# Patient Record
Sex: MALE | Race: WHITE | Employment: OTHER | URBAN - METROPOLITAN AREA
[De-identification: names, ages, dates, MRNs, and addresses within clinical notes are randomized per-mention and may not be internally consistent; named-entity substitution may affect disease eponyms.]

---

## 2017-06-22 PROBLEM — Z80.42 FAMILY HISTORY OF PROSTATE CANCER IN FATHER: Status: ACTIVE | Noted: 2017-06-22

## 2017-06-22 PROBLEM — Z12.5 PROSTATE CANCER SCREENING: Status: ACTIVE | Noted: 2017-06-22

## 2022-06-06 ENCOUNTER — HOSPITAL ENCOUNTER (OUTPATIENT)
Dept: PHYSICAL THERAPY | Age: 67
Discharge: HOME | End: 2022-06-06
Payer: MEDICARE

## 2022-06-06 PROCEDURE — 97162 PT EVAL MOD COMPLEX 30 MIN: CPT

## 2022-06-06 PROCEDURE — 97110 THERAPEUTIC EXERCISES: CPT

## 2022-06-06 PROCEDURE — 97116 GAIT TRAINING THERAPY: CPT

## 2022-06-06 NOTE — THERAPY EVALUATION
[address] [facility] PHYSICAL THERAPY AT [facility] [address]. [facility], [address]  Phone: [phone]  Fax: [phone] / [phone] [facility]  Patient Name: Essence Randhawa : 1955   Medical   Diagnosis: Pain in right knee [M25.561] Treatment Diagnosis: S/P R knee meniscus repair   Onset Date: 22     Referral Source: Lowell Arriola* Start of Care [facility]): 2022   Prior Hospitalization: See medical history Provider #: [id number]   Prior Level of Function: Independent all ADL's   Comorbidities: None reported   Medications: Verified on Patient Summary List   The Plan of Care and following information is based on the information from the initial evaluation.   ===========================================================================================  Assessment / key information: Patient is a 74 y.o. male who presents to [facility] S/P R meniscus root repair on 22. 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 Patient ambulates into the clinic w/ unlocked knee brace and no AD. Dianna Maid The patient rates his pain as 7/10 at worst, 1/10 at best, and 3/10 currently. Extensive conversation regarding MD protocol for weight bearing was done, instructing patient that he was to be TTWB until week six per protocol; message was left with physicians office if patient is allowed to ambulate w/o AD. Dianna Gurrola Physical therapist demonstrated difference between TTWB and partial/full weightbearing and instructed pt in appropriate WB precautions with bilateral axillary crutches.       Objective PT examination findings include:  (1) AAROM -10-90 deg  (2) MMT: 3-/5 throughout quad and hamstring; 4-/5 throughout R hip all planes  (3) Step to gait with bilateral axillary crutches provided by therapist; antalgic step to gait as entered clinic  (4) Edema : 47/42/39 cm Supra/jt/infra (43/42/39 cm on L)  A home exercise program was demonstrated and provided to address the above objective and functional deficits. Patient was reluctant to come more than 1x per week secondary to work schedule but verbalized the importance of maintaining HEP, in person PT and WB instructions per protocol. Patient can benefit from skilled PT interventions to improve strength, ROM, edema, ambulation, decrease pain, to facilitate performance of ADLs & improve overall functional status.   ===========================================================================================  Eval Complexity: History LOW Complexity : Zero comorbidities / personal factors that will impact the outcome / POC;  Examination  MEDIUM Complexity : 3 Standardized tests and measures addressing body structure, function, activity limitation and / or participation in recreation ; Presentation MEDIUM Complexity : Evolving with changing characteristics ; Decision Making MEDIUM Complexity : FOTO score of 26-74; Overall Complexity MEDIUM  Problem List: pain affecting function, decrease ROM, decrease strength, edema affecting function, impaired gait/ balance, decrease ADL/ functional abilitiies, decrease activity tolerance, decrease flexibility/ joint mobility and other FOTO 52/100   Treatment Plan may include any combination of the following: Therapeutic exercise, Therapeutic activities, Neuromuscular re-education, Physical agent/modality, Gait/balance training, Manual therapy, Patient education, Self Care training and Functional mobility training  Patient / Family readiness to learn indicated by: asking questions, trying to perform skills and interest  Persons(s) to be included in education: patient (P)  Barriers to Learning/Limitations: None  Measures taken:    Patient Goal (s): \"Strengthen Muscles\"   Patient self reported health status: excellent  Rehabilitation Potential: good   Short Term Goals:  To be accomplished in  3-4  weeks:  1)Patient to be independent and compliant with initial HEP to prevent further disability. 2) Patient to demonstrate independence with WB precautions and appropriate AD (1-2 weeks). 3) Patient to demonstrate full knee extension to allow for proper ambulate in knee brace  4) Patient will perform strong QS and demonstrate no lag with SLR in order to maintain stable TKE during gait.  Long Term Goals: To be accomplished in  6-8  weeks:  1) Patient to be independent & compliant with progressive HEP in preparation for D/C.  2) Improve FOTO score to >/= to 68 indicating significant functional improvement in order to return to PLOF. 3) Patient to improve strength to 4/5 in order to ambulate with normal gait out of the brace  4) Patient to demonstrate full AROM without pain to allow for independence w/ ADL's and stair negotiation. Frequency / Duration:   Patient to be seen  2-3  times per week for 3-4  weeks:  Patient / Caregiver education and instruction: self care, activity modification and exercises  Therapist Signature: Ed Schroeder PT Date: 5/0/8868   Certification Period: 6/6/22 to 9/6/22 Time: 8:43 AM   ===========================================================================================  I certify that the above Physical Therapy Services are being furnished while the patient is under my care. I agree with the treatment plan and certify that this therapy is necessary. Physician Signature:        Date:       Time:        Luis Resendez*  Please sign and return to [facility] or you may fax the signed copy to [phone]. Thank you.

## 2022-06-06 NOTE — THERAPY EVALUATION
PHYSICAL THERAPY - DAILY TREATMENT NOTE     Patient Name: Pam Young        Date: 2022  : 1955   YES Patient  Verified  Visit #:     Insurance: Payor: Kinza Grant / Plan: [place] & B / Product Type: Medicare /      In time: 8964 Out time: 315   Total Treatment Time: 65     Medicare/BCBS Time Tracking (below)   Total Timed Codes (min):  20 1:1 Treatment Time:  55     TREATMENT AREA =  Pain in right knee [M25.561]    SUBJECTIVE    Pain Level (on 0 to 10 scale):  3  / 10   Medication Changes/New allergies or changes in medical history, any new surgeries or procedures? NO    If yes, update Summary List   Subjective Functional Status/Changes:  []  No changes reported       See POC         OBJECTIVE  Modalities Rationale:     decrease edema, decrease inflammation and decrease pain to improve patient's ability to perform ADL's w/o pain and edema      min [] Estim, type/location:                                      []  att     []  unatt     []  w/US     []  w/ice    []  w/heat    min []  Mechanical Traction: type/lbs                   []  pro   []  sup   []  int   []  cont    []  before manual    []  after manual    min []  Ultrasound, settings/location:      min []  Iontophoresis w/ dexamethasone, location:                                               []  take home patch       []  in clinic   10 min [x]  Ice     []  Heat    location/position: Supine to R knee    min []  Vasopneumatic Device, press/temp:     min []  Other:    [x] Skin assessment post-treatment (if applicable):    [x]  intact    [x]  redness- no adverse reaction     []redness - adverse reaction:      10 min Therapeutic Exercise:  [x]  See flow sheet   Rationale:      increase ROM and increase strength to improve the patients ability to prepare for weight bearing with ambulation     10 min Gait Training:  _20__ feet with bilateral axillary crutches on level surfaces with SBA   Rationale:  To improve ambulation safety and efficiency in order to improve patient's ability to safely ambulate at home for self care. Billed With/As:   [x] TE   [] TA   [] Neuro   [] Self Care Patient Education: [x] Review HEP    [] Progressed/Changed HEP based on:   [] positioning   [] body mechanics   [] transfers   [] heat/ice application    [] other:        Other Objective/Functional Measures:    See POC  See TE Flow sheet     Post Treatment Pain Level (on 0 to 10) scale:   2-3  / 10     ASSESSMENT    Assessment/Changes in Function:     See POC     []  See Progress Note/Recertification   Patient will continue to benefit from skilled PT services to modify and progress therapeutic interventions, address functional mobility deficits, address ROM deficits, address strength deficits, analyze and address soft tissue restrictions, analyze and cue movement patterns, analyze and modify body mechanics/ergonomics and assess and modify postural abnormalities to attain remaining goals.       Progress toward goals / Updated goals:    See POC     PLAN    [x]  Upgrade activities as tolerated YES Continue plan of care   []  Discharge due to :    []  Other:      Therapist: Raymond Cantu PT    Date: 6/6/2022 Time: 8:42 AM     Future Appointments   Date Time Provider Loi Erazo   6/6/2022 11:00 AM Rebeca Lira, PT [facility] [id number]

## 2022-06-14 ENCOUNTER — APPOINTMENT (OUTPATIENT)
Dept: PHYSICAL THERAPY | Age: 67
End: 2022-06-14
Payer: MEDICARE

## 2022-06-21 ENCOUNTER — APPOINTMENT (OUTPATIENT)
Dept: PHYSICAL THERAPY | Age: 67
End: 2022-06-21
Payer: MEDICARE

## 2022-06-28 ENCOUNTER — APPOINTMENT (OUTPATIENT)
Dept: PHYSICAL THERAPY | Age: 67
End: 2022-06-28
Payer: MEDICARE

## 2022-07-05 ENCOUNTER — APPOINTMENT (OUTPATIENT)
Dept: PHYSICAL THERAPY | Age: 67
End: 2022-07-05

## 2022-07-12 ENCOUNTER — APPOINTMENT (OUTPATIENT)
Dept: PHYSICAL THERAPY | Age: 67
End: 2022-07-12

## 2022-07-19 ENCOUNTER — APPOINTMENT (OUTPATIENT)
Dept: PHYSICAL THERAPY | Age: 67
End: 2022-07-19

## 2022-09-01 NOTE — PROGRESS NOTES
[facility] PHYSICAL THERAPY AT [facility] [address]  Phone: [phone]  Fax: [phone] SUMMARY FOR PHYSICAL THERAPY          Patient Name: Geetha Mayes : 1955   Treatment/Medical Diagnosis: Pain in right knee [M25.561]   Onset Date: 22    Referral Source: Minesh Banerjee* Start of Care [facility]): 22   Prior Hospitalization: See Medical History Provider #: [id number]   Prior Level of Function: Independent all ADl's   Comorbidities: None reported   Medications: Verified on Patient Summary List   Visits from [facility]: 1 Missed Visits: 6     Previous Goals:  1)Patient to be independent and compliant with initial HEP to prevent further disability. 2) Patient to demonstrate independence with WB precautions and appropriate AD (1-2 weeks). 3) Patient to demonstrate full knee extension to allow for proper ambulate in knee brace  4) Patient will perform strong QS and demonstrate no lag with SLR in order to maintain stable TKE during gait. Key Functional Changes/Progress: Patient cancelled all appointments following initial evaluation. Unable to assess goals or improvements    Assessments/Recommendations: Discontinue therapy due to lack of attendance or compliance. If you have any questions/comments please contact us directly at [phone]. Thank you for allowing us to assist in the care of your patient.     Therapist Signature: Raymond Canut, ALANA Date: 22   Reporting Period: 22 to 22 Time: 11:06 AM